# Patient Record
Sex: MALE | Race: WHITE | NOT HISPANIC OR LATINO | URBAN - METROPOLITAN AREA
[De-identification: names, ages, dates, MRNs, and addresses within clinical notes are randomized per-mention and may not be internally consistent; named-entity substitution may affect disease eponyms.]

---

## 2017-07-26 ENCOUNTER — IMPORTED ENCOUNTER (OUTPATIENT)
Dept: URBAN - METROPOLITAN AREA CLINIC 43 | Facility: CLINIC | Age: 82
End: 2017-07-26

## 2017-07-26 PROBLEM — H16.223: Noted: 2017-07-26

## 2017-08-28 NOTE — PATIENT DISCUSSION
Surgery Counseling:  I have discussed the option of glasses versus cataract surgery versus following, It was explained that when vision no longer meets the patient's visual needs and a new prescription for glasses is not likely to improve the patient's visual symptoms, the option of cataract surgery is a reasonable next step. It was explained that there is no guarantee that removing the cataract will improve their visual symptoms; however, it is believed that the cataract is contributing to the patient's visual impairment and surgery may noticeably improve both the visual and functional status of the patient. After this discussion, the patient desires to proceed with cataract surgery with implantation of an intraocular lens to improve their vision for watching TV.

## 2017-08-28 NOTE — PATIENT DISCUSSION
CATARACT, OU - VISUALLY SIGNIFICANT. SCHEDULE PHACO WITH IOL OS FIRST THEN OD IF VISUAL SYMPTOMS PERSIST. GLASSES RX GIVEN TO FILL IF DESIRES IN THE EVENT PATIENT DOES NOT PROCEED WITH SURGERY. RETINAL CLEARANCE NOT REQUIRED PREOPERATIVELY.

## 2017-09-14 NOTE — PATIENT DISCUSSION
New Prescription: Besivance (besifloxacin): drops,suspension: 0.6% 1 drop three times a day as directed into left eye 09-

## 2017-09-14 NOTE — PATIENT DISCUSSION
New Prescription: Prolensa (bromfenac): drops: 0.07% 1 drop every morning as directed into left eye 09-

## 2017-09-14 NOTE — PATIENT DISCUSSION
Continue: PreserVision AREDS 2 (vit c,b-sk-akcbx-lutein-zeaxan): capsule: 015-300-20-9 mg-unit-mg-mg 1 capsule twice a day by mouth

## 2017-09-14 NOTE — PATIENT DISCUSSION
Surgery Drop Counseling:  I have prescribed Besivance, Prolensa and Durezol for use as directed before and after cataract surgery.

## 2017-09-14 NOTE — PATIENT DISCUSSION
New Prescription: Durezol (difluprednate): drops: 0.05% 1 drop three times a day as directed into left eye 09-

## 2017-09-20 NOTE — PATIENT DISCUSSION
Continue: Besivance (besifloxacin): drops,suspension: 0.6% 1 drop three times a day as directed into left eye 09-

## 2017-09-20 NOTE — PATIENT DISCUSSION
Continue: PreserVision AREDS 2 (vit c,z-dg-uolkm-lutein-zeaxan): capsule: 114-375-25-8 mg-unit-mg-mg 1 capsule twice a day by mouth

## 2017-10-09 NOTE — PATIENT DISCUSSION
Continue as directed in left eye. Start 2 days prior to surgery in right eye and continue as directed.

## 2017-10-09 NOTE — PATIENT DISCUSSION
Continue: Besivance (besifloxacin): drops,suspension: 0.6% 1 drop three times a day as directed into right eye 09-

## 2017-10-09 NOTE — PATIENT DISCUSSION
Continue: PreserVision AREDS 2 (vit c,w-ok-wjuqe-lutein-zeaxan): capsule: 210-609-03-6 mg-unit-mg-mg 1 capsule twice a day by mouth

## 2017-10-09 NOTE — PATIENT DISCUSSION
Taper as directed in left eye. Start 3X/day immediately after surgery in right eye and continue as directed.

## 2017-10-09 NOTE — PATIENT DISCUSSION
*CATARACT, OD - BECOMING VISUALLY SIGNIFICANT BUT NOT BOTHERSOME TO PATIENT AT THIS TIME. FOLLOW AS SCHEDULED.

## 2017-10-09 NOTE — PATIENT DISCUSSION
S/P PC IOL, OS  : DOING WELL. CONTINUE TO TAPER DROPS AS DIRECTED. OFFER SPEC RX OR USE OTC's IF PATIENT DESIRES. CALL WITH ANY VISUAL PROBLEMS.

## 2017-10-23 NOTE — PATIENT DISCUSSION
S/P PE IOL, OS:  DOING WELL. SPECS RX OFFERED. RX ARC IN SPECS TO MINIMIZE GLARE. RETURN FOR FOLLOW-UP AS SCHEDULED.

## 2017-10-23 NOTE — PATIENT DISCUSSION
Continue: PreserVision AREDS 2 (vit c,d-tr-wekft-lutein-zeaxan): capsule: 388-813-10-6 mg-unit-mg-mg 1 capsule twice a day by mouth

## 2018-04-16 NOTE — PATIENT DISCUSSION
*PCF OS; BECOMING VISUALLY SIGNIFICANT BUT NOT BOTHERSOME TO PATIENT AT THIS TIME. CONTINUE TO FOLLOW FOR NOW. OFFER SPEC RX UPDATE.  PATIENT DOES NOT DESIRE TO GO THROUGH WITH SX.

## 2018-04-16 NOTE — PATIENT DISCUSSION
Continue: PreserVision AREDS 2 (vit c,i-va-ggfms-lutein-zeaxan): capsule: 679-558-04-4 mg-unit-mg-mg 1 capsule twice a day by mouth

## 2018-04-16 NOTE — PATIENT DISCUSSION
*CATARACTS, OD - BECOMING VISUALLY SIGNIFICANT BUT NOT BOTHERSOME TO PATIENT AT THIS TIME. SPEC RX OFFERED. FOLLOW AS SCHEDULED.

## 2018-10-29 NOTE — PATIENT DISCUSSION
Continue: PreserVision AREDS 2 (vit c,b-fq-nhcxq-lutein-zeaxan): capsule: 352-779-76-0 mg-unit-mg-mg 1 capsule twice a day by mouth

## 2018-10-29 NOTE — PATIENT DISCUSSION
AMD (DRY), OU:  APPEARS STABLE. NO NEED FOR IMMEDIATE TREATMENT WITH RETINAL SPECIALIST AT THIS POINT. PRESCRIBE AREDS 2 VITAMINS / AMSLER GRID DAILY / UV PROTECTION. SMOKING AVOIDANCE ADVISED. PATIENT TO CONTACT OFFICE IMMEDIATELY IF ANY CHANGES IN AMSLER GRID OCCUR. REFER TO RETINAL SPECIALIST, DR. Jacque Rojas FOR EVALUATION IN 6 MONTHS.

## 2018-10-29 NOTE — PATIENT DISCUSSION
CATARACT, OD: VISUALLY SIGNIFICANT. SURGERY INDICATED. PATIENT WISHES TO WAIT AT THIS TIME. GLASSES PRESCRIPTION GIVEN. PATIENT TO CALL BACK IF THEY DECIDE TO PROCEED WITH SURGERY WITHIN 6 MONTHS. OTHERWISE, FOLLOW AS SCHEDULED.

## 2019-06-28 NOTE — PATIENT DISCUSSION
Continue: PreserVision AREDS 2 (vit c,i-ql-osozp-lutein-zeaxan): capsule: 468-736-28-5 mg-unit-mg-mg 1 capsule twice a day by mouth

## 2019-09-12 NOTE — PATIENT DISCUSSION
CATARACT, OD - VISUALLY SIGNIFICANT. SCHEDULE PHACO WITH IOL OD AND IOL CALCULATION. GLASSES RX GIVEN TO FILL IF DESIRES IN THE EVENT PATIENT DOES NOT PROCEED WITH SURGERY.

## 2019-09-12 NOTE — PATIENT DISCUSSION
New Prescription: Prolensa (bromfenac): drops: 0.07% 1 drop every morning as directed into right eye 09-

## 2019-09-12 NOTE — PATIENT DISCUSSION
AMD (DRY), OU:  PRESCRIBE AREDS 2 VITAMINS AND RECOMMEND UV PROTECTION. PATIENT IS AWARE OF AMSLER GRID AND HOW TO CHECK FOR PROGRESSION. SMOKING AVOIDANCE ADVISED. RETURN FOR FOLLOW-UP AS SCHEDULED. CONTINUE TO FOLLOW WITH DR. Kevan Pace AS SCHEDULED.

## 2019-09-12 NOTE — PATIENT DISCUSSION
DERMATOCHALASIS / PTOSIS RUL AND ELDER :  VISUALLY SIGNIFICANT TO PATIENT. SCHEDULE WITH OCULOPLASTIC SPECIALIST IF PATIENT DESIRES.

## 2019-09-12 NOTE — PATIENT DISCUSSION
New Prescription: Besivance (besifloxacin): drops,suspension: 0.6% 1 drop three times a day as directed into right eye 09-

## 2019-09-12 NOTE — PATIENT DISCUSSION
Continue: PreserVision AREDS 2 (vit c,v-nb-tfitb-lutein-zeaxan): capsule: 616-176-01-1 mg-unit-mg-mg 1 capsule twice a day by mouth

## 2019-09-12 NOTE — PATIENT DISCUSSION
New Prescription: Durezol (difluprednate): drops: 0.05% 1 drop three times a day as directed into right eye 09-

## 2019-09-25 NOTE — PATIENT DISCUSSION
Continue: Durezol (difluprednate): drops: 0.05% 1 drop three times a day as directed into right eye 09-

## 2019-09-25 NOTE — PATIENT DISCUSSION
Continue: PreserVision AREDS 2 (vit c,f-un-htuxz-lutein-zeaxan): capsule: 993-654-17-3 mg-unit-mg-mg 1 capsule twice a day by mouth

## 2019-10-14 NOTE — PATIENT DISCUSSION
Continue: PreserVision AREDS 2 (vit c,w-za-kxjvl-lutein-zeaxan): capsule: 865-308-44-0 mg-unit-mg-mg 1 capsule twice a day by mouth

## 2019-10-14 NOTE — PATIENT DISCUSSION
S/P PE IOL, OD:  DOING WELL. CONTINUE DROPS AS DIRECTED. SPECS RX OFFERED. RX ARC IN SPECS TO MINIMIZE GLARE. RETURN FOR FOLLOW-UP AS SCHEDULED.

## 2019-10-29 NOTE — PATIENT DISCUSSION
S/P PE IOL OU WITH DECOMPENSATING ESOPHORIA AND SYMPTOMATIC DIPLOPIA: SMALL HORIZONTAL PRISM RX RELIEVED PATIENT SYMPTOMS WHEN TRIAL FRAMED IN OFFICE. DID NOT IMPACT NEAR VISION WITH TRIAL FRAME. NEW SPEC RX WITH PRISM PROVIDED. PATIENT TO CALL IF SYMPTOMS PERSIST AFTER LENS REMAKE.

## 2019-10-29 NOTE — PATIENT DISCUSSION
Continue: PreserVision AREDS 2 (vit c,i-mo-ebfys-lutein-zeaxan): capsule: 502-273-54-4 mg-unit-mg-mg 1 capsule twice a day by mouth

## 2020-04-19 ASSESSMENT — VISUAL ACUITY
OS_SC: 20/40 -2
OD_CC: J2
OS_CC: J2
OD_SC: 20/40 +2

## 2020-04-19 ASSESSMENT — TONOMETRY
OS_IOP_MMHG: 15.0
OD_IOP_MMHG: 12.0

## 2020-06-26 NOTE — PATIENT DISCUSSION
STABLE NON-EXUDATIVE AMD, OU:  CONTINUE 21ST CENTRY EYES BID / AMSLER GRID QD/ UV PROTECTION. SMOKING AVOIDANCE REVIEWED. RETURN FOR FOLLOW-UP AS SCHEDULED.

## 2020-09-16 NOTE — PATIENT DISCUSSION
AMD (DRY), OU:  PRESCRIBE AREDS 2 VITAMINS AND RECOMMEND UV PROTECTION. PATIENT IS AWARE OF AMSLER GRID AND HOW TO CHECK FOR PROGRESSION. SMOKING AVOIDANCE ADVISED. CONTINUE WITH DR. Silas Goode AS SCHEDULED.

## 2021-07-13 NOTE — PATIENT DISCUSSION
Offered no-charge recheck of refraction in 3-4 weeks due to his complaints of fluctuating vision, but patient declined. <<----- Click to add NO significant Past Surgical History

## 2022-06-04 ENCOUNTER — TELEPHONE ENCOUNTER (OUTPATIENT)
Dept: URBAN - METROPOLITAN AREA CLINIC 68 | Facility: CLINIC | Age: 87
End: 2022-06-04

## 2022-06-05 ENCOUNTER — TELEPHONE ENCOUNTER (OUTPATIENT)
Dept: URBAN - METROPOLITAN AREA CLINIC 68 | Facility: CLINIC | Age: 87
End: 2022-06-05

## 2022-06-25 ENCOUNTER — TELEPHONE ENCOUNTER (OUTPATIENT)
Age: 87
End: 2022-06-25

## 2022-06-26 ENCOUNTER — TELEPHONE ENCOUNTER (OUTPATIENT)
Age: 87
End: 2022-06-26

## 2023-02-01 NOTE — PATIENT DISCUSSION
Look for youtube video on deltoid rehab exercises     Well Visit, Ages 25 to 72: Care Instructions  Well visits can help you stay healthy. Your doctor has checked your overall health and may have suggested ways to take good care of yourself. Your doctor also may have recommended tests. You can help prevent illness with healthy eating, good sleep, vaccinations, regular exercise, and other steps. Get the tests that you and your doctor decide on. Depending on your age and risks, examples might include screening for diabetes; hepatitis C; HIV; and cervical, breast, lung, and colon cancer. Screening helps find diseases before any symptoms appear. Eat healthy foods. Choose fruits, vegetables, whole grains, lean protein, and low-fat dairy foods. Limit saturated fat and reduce salt. Limit alcohol. Men should have no more than 2 drinks a day. Women should have no more than 1. For some people, no alcohol is the best choice. Exercise. Get at least 30 minutes of exercise on most days of the week. Walking can be a good choice. Reach and stay at your healthy weight. This will lower your risk for many health problems. Take care of your mental health. Try to stay connected with friends, family, and community, and find ways to manage stress. If you're feeling depressed or hopeless, talk to someone. A counselor can help. If you don't have a counselor, talk to your doctor. Talk to your doctor if you think you may have a problem with alcohol or drug use. This includes prescription medicines and illegal drugs. Avoid tobacco and nicotine: Don't smoke, vape, or chew. If you need help quitting, talk to your doctor. Practice safer sex. Getting tested, using condoms or dental dams, and limiting sex partners can help prevent STIs. Use birth control if it's important to you to prevent pregnancy. Talk with your doctor about your choices and what might be best for you. Prevent problems where you can.  Protect your spherecylinderaxisaddprismvertexVAInt VANVExaminer skin from too much sun, wash your hands, brush your teeth twice a day, and wear a seat belt in the car. Where can you learn more? Go to http://www.gamboa.com/ and enter P072 to learn more about \"Well Visit, Ages 25 to 72: Care Instructions. \"  Current as of: March 9, 2022               Content Version: 13.5  © 9097-5813 [employer]. Care instructions adapted under license by [facility] ([facility]). If you have questions about a medical condition or this instruction, always ask your healthcare professional. [place] [address] any warranty or liability for your use of this information.

## 2024-02-21 NOTE — PATIENT DISCUSSION
Continue: Durezol (difluprednate): drops: 0.05% 1 drop three times a day as directed into left eye 09- left message to give office a call back      Need to know what home health agency they use